# Patient Record
Sex: MALE | Race: OTHER | Employment: FULL TIME
[De-identification: names, ages, dates, MRNs, and addresses within clinical notes are randomized per-mention and may not be internally consistent; named-entity substitution may affect disease eponyms.]

---

## 2024-05-07 ENCOUNTER — APPOINTMENT (OUTPATIENT)
Facility: HOSPITAL | Age: 34
End: 2024-05-07
Payer: COMMERCIAL

## 2024-05-07 ENCOUNTER — HOSPITAL ENCOUNTER (EMERGENCY)
Facility: HOSPITAL | Age: 34
Discharge: HOME | End: 2024-05-07
Attending: EMERGENCY MEDICINE
Payer: COMMERCIAL

## 2024-05-07 VITALS
DIASTOLIC BLOOD PRESSURE: 93 MMHG | WEIGHT: 177.03 LBS | SYSTOLIC BLOOD PRESSURE: 126 MMHG | HEART RATE: 62 BPM | OXYGEN SATURATION: 100 % | BODY MASS INDEX: 28.45 KG/M2 | RESPIRATION RATE: 16 BRPM | HEIGHT: 66 IN | TEMPERATURE: 98.7 F

## 2024-05-07 DIAGNOSIS — K59.00 CONSTIPATION, UNSPECIFIED CONSTIPATION TYPE: Primary | ICD-10-CM

## 2024-05-07 LAB
ANION GAP BLD CALC-SCNC: NORMAL MMOL/L (ref 10–20)
CA-I BLD-MCNC: 1.21 MMOL/L (ref 1.12–1.32)
CHLORIDE BLD-SCNC: 106 MMOL/L (ref 98–107)
CREAT BLD-MCNC: 0.67 MG/DL (ref 0.6–1.3)
ERYTHROCYTE [DISTWIDTH] IN BLOOD BY AUTOMATED COUNT: 12.4 % (ref 11.5–14.5)
GLUCOSE BLD-MCNC: 89 MG/DL (ref 65–100)
HCT VFR BLD AUTO: 46.7 % (ref 36.6–50.3)
HGB BLD-MCNC: 16.3 G/DL (ref 12.1–17)
MCH RBC QN AUTO: 29.9 PG (ref 26–34)
MCHC RBC AUTO-ENTMCNC: 34.9 G/DL (ref 30–36.5)
MCV RBC AUTO: 85.5 FL (ref 80–99)
NRBC BLD-RTO: 0 PER 100 WBC
PLATELET # BLD AUTO: 228 K/UL (ref 150–400)
PMV BLD AUTO: 10.8 FL (ref 8.9–12.9)
POTASSIUM BLD-SCNC: 3.6 MMOL/L (ref 3.5–5.1)
RBC # BLD AUTO: 5.46 M/UL (ref 4.1–5.7)
SERVICE CMNT-IMP: NORMAL
SODIUM BLD-SCNC: 140 MMOL/L (ref 136–145)
WBC # BLD AUTO: 10 K/UL (ref 4.1–11.1)

## 2024-05-07 PROCEDURE — 74177 CT ABD & PELVIS W/CONTRAST: CPT

## 2024-05-07 PROCEDURE — 6370000000 HC RX 637 (ALT 250 FOR IP): Performed by: EMERGENCY MEDICINE

## 2024-05-07 PROCEDURE — 99285 EMERGENCY DEPT VISIT HI MDM: CPT

## 2024-05-07 PROCEDURE — 6360000004 HC RX CONTRAST MEDICATION: Performed by: EMERGENCY MEDICINE

## 2024-05-07 PROCEDURE — 85027 COMPLETE CBC AUTOMATED: CPT

## 2024-05-07 PROCEDURE — 80047 BASIC METABLC PNL IONIZED CA: CPT

## 2024-05-07 PROCEDURE — 36415 COLL VENOUS BLD VENIPUNCTURE: CPT

## 2024-05-07 RX ORDER — BISACODYL 10 MG
10 SUPPOSITORY, RECTAL RECTAL ONCE
Status: COMPLETED | OUTPATIENT
Start: 2024-05-07 | End: 2024-05-07

## 2024-05-07 RX ORDER — LORAZEPAM 1 MG/1
1 TABLET ORAL ONCE
Status: COMPLETED | OUTPATIENT
Start: 2024-05-07 | End: 2024-05-07

## 2024-05-07 RX ORDER — LACTULOSE 10 G/15ML
30 SOLUTION ORAL ONCE
Status: COMPLETED | OUTPATIENT
Start: 2024-05-07 | End: 2024-05-07

## 2024-05-07 RX ADMIN — IOPAMIDOL 100 ML: 755 INJECTION, SOLUTION INTRAVENOUS at 19:03

## 2024-05-07 RX ADMIN — LORAZEPAM 1 MG: 1 TABLET ORAL at 13:07

## 2024-05-07 RX ADMIN — LACTULOSE 30 G: 10 SOLUTION ORAL at 13:08

## 2024-05-07 RX ADMIN — MAGNESIUM HYDROXIDE 30 ML: 400 SUSPENSION ORAL at 14:33

## 2024-05-07 RX ADMIN — BISACODYL 10 MG: 10 SUPPOSITORY RECTAL at 14:33

## 2024-05-07 ASSESSMENT — ENCOUNTER SYMPTOMS
ABDOMINAL DISTENTION: 1
CONSTIPATION: 1

## 2024-05-07 ASSESSMENT — LIFESTYLE VARIABLES
HOW MANY STANDARD DRINKS CONTAINING ALCOHOL DO YOU HAVE ON A TYPICAL DAY: PATIENT DOES NOT DRINK
HOW OFTEN DO YOU HAVE A DRINK CONTAINING ALCOHOL: NEVER

## 2024-05-07 ASSESSMENT — PAIN - FUNCTIONAL ASSESSMENT: PAIN_FUNCTIONAL_ASSESSMENT: 0-10

## 2024-05-07 ASSESSMENT — PAIN SCALES - GENERAL: PAINLEVEL_OUTOF10: 9

## 2024-05-07 NOTE — DISCHARGE INSTRUCTIONS
It was a pleasure taking care of you at [facility] today.  We know that when you come to [facility], you are entrusting us with your health, comfort, and safety.  Our physicians and nurses honor that trust, and we truly appreciate the opportunity to care for you and your loved ones.      We also value your feedback.  If you receive a survey about your Emergency Department experience today, please fill it out.  We care about our patients' feedback, and we listen to what you have to say.  Thank you!

## 2024-05-07 NOTE — ED PROVIDER NOTES
interpretive errors are inadvertently transcribed by the computer software.   Please disregard these errors and excuse any errors that have escaped final proofreading.    Joe Dhaliwal MD    I personally performed the services described in this documentation on this date 5/7/24  for patient Sinan Gil.      Joe Dhaliwal MD  7:18 PM         Joe Dhaliwal MD  05/07/24 [id number]